# Patient Record
Sex: FEMALE | Race: WHITE | URBAN - METROPOLITAN AREA
[De-identification: names, ages, dates, MRNs, and addresses within clinical notes are randomized per-mention and may not be internally consistent; named-entity substitution may affect disease eponyms.]

---

## 2017-01-12 ENCOUNTER — OFFICE VISIT (OUTPATIENT)
Dept: NEUROLOGY | Facility: CLINIC | Age: 57
End: 2017-01-12

## 2017-01-12 VITALS — HEART RATE: 72 BPM | SYSTOLIC BLOOD PRESSURE: 185 MMHG | DIASTOLIC BLOOD PRESSURE: 115 MMHG | RESPIRATION RATE: 16 BRPM

## 2017-01-12 DIAGNOSIS — G43.009 MIGRAINE WITHOUT AURA AND WITHOUT STATUS MIGRAINOSUS, NOT INTRACTABLE: Primary | ICD-10-CM

## 2017-01-12 RX ORDER — NORTRIPTYLINE HCL 10 MG
CAPSULE ORAL
Qty: 90 CAPSULE | Refills: 1 | Status: SHIPPED | OUTPATIENT
Start: 2017-01-12

## 2017-01-12 RX ORDER — LISINOPRIL 20 MG/1
20 TABLET ORAL DAILY
COMMUNITY

## 2017-01-12 ASSESSMENT — PAIN SCALES - GENERAL: PAINLEVEL: MILD PAIN (3)

## 2017-01-12 NOTE — MR AVS SNAPSHOT
After Visit Summary   2017    Kamla Francisco    MRN: [id number]           Patient Information     Date Of Birth          1960        Visit Information        Provider Department      2017 2:30 PM Guilherme Garrison MD [facility]        Today's Diagnoses     Migraine without aura and without status migrainosus, not intractable    -  1        Follow-ups after your visit        Follow-up notes from your care team     Return in about 4 weeks (around 2017).      Your next 10 appointments already scheduled     2017 10:30 AM   Return Visit with Guilherme Garrison MD   [facility] ([facility])    [address], [address]  [facility] [address] [phone]              Who to contact     Please call your clinic at [phone] to:    Ask questions about your health    Make or cancel appointments    Discuss your medicines    Learn about your test results    Speak to your doctor   If you have compliments or concerns about an experience at your clinic, or if you wish to file a complaint, please contact [facility] Patient Relations at [phone] or email us at [email or web address]         Additional Information About Your Visit        MyChart Information     [employer] is an electronic gateway that provides easy, online access to your medical records. With [employer] [place], you can request a clinic appointment, read your test results, renew a prescription or communicate with your care team.     To sign up for [employer] visit the website at [email or web address]   You will be asked to enter the access code listed below, as well as some personal information. Please follow the directions to create your username and password.     Your access code is: [email or web address]  Expires: 2017  3:03 PM     Your access code will  in 90 days. If  you need help or a new code, please contact your [facility] or call [phone] for assistance.        Care EveryWhere ID     This is your Care EveryWhere ID. This could be used by other organizations to access your [place] medical records  [id number]        Your Vitals Were     Pulse Respirations                72 16           Blood Pressure from Last 3 Encounters:   01/12/17 185/115    Weight from Last 3 Encounters:   No data found for Wt              Today, you had the following     No orders found for display         Today's Medication Changes          These changes are accurate as of: 1/12/17  3:03 PM.  If you have any questions, ask your nurse or doctor.               Start taking these medicines.        Dose/Directions    nortriptyline 10 MG capsule   Commonly known as:  PAMELOR   Used for:  Migraine without aura and without status migrainosus, not intractable        Start at 1 tab at bedtime for 5 days, then 2 tabs at bedtime for 5 days, then 3 tabs at bedtime.   Quantity:  90 capsule   Refills:  1            Where to get your medicines      These medications were sent to [facility] [address] [place] [address]    Hours:  24-hours Phone:  [phone]    - nortriptyline 10 MG capsule             Primary Care Provider Office Phone # Fax #    Janis Berman [phone]       [facility] [address] [address]        Thank you!     Thank you for choosing [facility]  for your care. Our goal is always to provide you with excellent care. Hearing back from our patients is one way we can continue to improve our services. Please take a few minutes to complete the written survey that you may receive in the mail after your visit with us. Thank you!             Your Updated Medication List -  Protect others around you: Learn how to safely use, store and throw away your medicines at www.disposemymeds.org.          This list is accurate as of: 1/12/17  3:03 PM.  Always use your most recent med list.                   Brand Name Dispense Instructions for use    lisinopril 20 MG tablet    PRINIVIL/ZESTRIL     Take 20 mg by mouth daily       nortriptyline 10 MG capsule    PAMELOR    90 capsule    Start at 1 tab at bedtime for 5 days, then 2 tabs at bedtime for 5 days, then 3 tabs at bedtime.

## 2017-01-12 NOTE — Clinical Note
"2017       RE: Kamla Francisco  [address] [place] [address]     Dear Colleague,    Thank you for referring your patient, Kamla Francisco, to the [facility] at [facility]. Please see a copy of my visit note below.    2017      WILBUR Ingram   [facility]    [address] [place] [address]      RE: Kamla Francisco   MRN: [id number]   : 1960      Dear Janis:      Thank you for the referral of Kamla Francisco for neurologic consultation with chief complaint of headache.  As you know, she is a 56-year-old right-handed woman.  She says she has had migraine since the 2nd grade.  Her headaches have gotten worse in the last 8 months or so.  She used to get a headache every month or 2 and now she is getting the bad headaches weekly in addition to a chronic daily headache.  The daily headache is more in the occipital and posterior cervical region.  It is a dull pain and radiates into the left side of the head more than the right side of the head.  It is annoying.  She has one when she is talking to me today.  She will take ibuprofen and drink water.  She is using ibuprofen 3-4 times a day for this headache.  In addition, she gets migraine headaches.  These are associated with nausea and vomiting.  They typically occur during sleep and awaken her.  It is present in the back of the head.  She feels like she has \"been hit by a bat.\"  It is a pounding and a pressure.  She will take Excedrin and drink a Coca Cola.  She will have photophobia, phonophobia and sensitivity to smells.  She will have to try and get to sleep with the headache.  It will wipe her out all day.  She works at a [employer] center and has not been working the last 3 months for various reasons.  She has been seeing a therapist for some stress issues and depression.  She does not notice anything new or a " different quality to the migraine type headache, only that it is occurring more frequently.  She did have an MRI scan of the brain performed in 2014 and that was normal.  A CT scan of the brain was performed in 2016 and that too was normal.  She is using Excedrin and ibuprofen pretty much on a daily basis.  She has been treated with propranolol in the past.  She has never been on nortriptyline, gabapentin or topiramate.  She has been on Imitrex in the past as well without benefit.  She was given a prescription for trazodone today by her therapist but has not yet filled it.  There was also a question of whether or not to start Lyrica today.  She also reports symptoms of intermittent vertigo when she lies down and turns her head.  She has no problem otherwise with vision, hearing, speech or swallowing.  She has no problem with bowel or bladder control.  She has no symptoms with regard to smell or taste.  She does get occasional numbness and tingling in her arms or legs.  She does comment that the daily over-the-counter remedies are upsetting her stomach.      Her past medical history is largely noncontributory.  She does have high blood pressure.  She does not have diabetes, thyroid or asthma.  She has not had any pertinent surgery.  She does not drink or smoke.  She has no history of head injury.  Social history is that she is a , has 4 children.  She works in a [employer] center.  Family history is noncontributory and negative for headache.      On exam, the patient is cooperative and in no distress.  Her blood pressure is 185/115.  There are no carotid bruits.  Auscultation of the heart shows S1, S2, without murmur, rub or gallop.  Chest is clear to auscultation.  On neurologic exam, the patient is alert, oriented and lucid.  Cranial nerve testing shows full visual fields to confrontation.  Funduscopic exam shows sharp discs bilaterally.  Eye movements are complete and conjugate without nystagmus.  Visual acuity is  20/20 bilaterally.  Funduscopic exam does show venous pulsations.  Pupils react to light.  Facies are symmetric.  Tongue protrudes in the midline.  Motor evaluation shows no pronator drift, normal finger tapping, finger-nose-finger and heel-knee-shin.  She has good strength in the arms and legs.  Muscle stretch reflexes are trace in the arms and reduced at the knees and ankles.  Toes are downgoing.  Sensory exam shows preserved vibration and temperature.  Romberg sign is absent.  The patient can walk on her heels and toes.  Her tandem gait backwards is unsteady.      ASSESSMENT:   1.  Mixed headache disorder, including common migraine and tension headache.      DISCUSSION:  The patient is seen for evaluation of headache.  Her exam on these points is normal.  Presentation suggests tension headache in addition to an increased number of migraine type headaches.  She is using anti-inflammatories and Excedrin on a daily basis.  This is not a  way to move forward successfully, and she could get into problems with medication overuse.  I am going to try her on nortriptyline 10 mg at night, building up to 30 mg at night.  Side effects were reviewed including heart palpitations.  If she has problems with the medicine, she knows to stop it and call me.  Hopefully, she will be able to cut back on her use of over-the-counter remedies with the nortriptyline and hopefully it will help with her sleep as well.  I will see her in followup in a month, but encouraged her to call if there were questions or problems before then.      Sincerely,      Guilherme Garrison MD           D: 2017 15:11   T: 2017 07:31   MT: TAMI      Name:     LUDMILA REN   MRN:      [id number]        Account:      [id number]   :      1960           Service Date: 2017      Document: [id number]

## 2017-01-13 NOTE — PROGRESS NOTES
"2017      WILBUR Ingram   [facility]    [address] [place] [address]      RE: Kamla Francisco   MRN: [id number]   : 1960      Dear Janis:      Thank you for the referral of Kamla Fracnisco for neurologic consultation with chief complaint of headache.  As you know, she is a 56-year-old right-handed woman.  She says she has had migraine since the 2nd grade.  Her headaches have gotten worse in the last 8 months or so.  She used to get a headache every month or 2 and now she is getting the bad headaches weekly in addition to a chronic daily headache.  The daily headache is more in the occipital and posterior cervical region.  It is a dull pain and radiates into the left side of the head more than the right side of the head.  It is annoying.  She has one when she is talking to me today.  She will take ibuprofen and drink water.  She is using ibuprofen 3-4 times a day for this headache.  In addition, she gets migraine headaches.  These are associated with nausea and vomiting.  They typically occur during sleep and awaken her.  It is present in the back of the head.  She feels like she has \"been hit by a bat.\"  It is a pounding and a pressure.  She will take Excedrin and drink a Coca Cola.  She will have photophobia, phonophobia and sensitivity to smells.  She will have to try and get to sleep with the headache.  It will wipe her out all day.  She works at a [employer] center and has not been working the last 3 months for various reasons.  She has been seeing a therapist for some stress issues and depression.  She does not notice anything new or a different quality to the migraine type headache, only that it is occurring more frequently.  She did have an MRI scan of the brain performed in  and that was normal.  A CT scan of the brain was performed in  and that too was normal.  She is using Excedrin and ibuprofen pretty much on a daily basis.  She has been " treated with propranolol in the past.  She has never been on nortriptyline, gabapentin or topiramate.  She has been on Imitrex in the past as well without benefit.  She was given a prescription for trazodone today by her therapist but has not yet filled it.  There was also a question of whether or not to start Lyrica today.  She also reports symptoms of intermittent vertigo when she lies down and turns her head.  She has no problem otherwise with vision, hearing, speech or swallowing.  She has no problem with bowel or bladder control.  She has no symptoms with regard to smell or taste.  She does get occasional numbness and tingling in her arms or legs.  She does comment that the daily over-the-counter remedies are upsetting her stomach.      Her past medical history is largely noncontributory.  She does have high blood pressure.  She does not have diabetes, thyroid or asthma.  She has not had any pertinent surgery.  She does not drink or smoke.  She has no history of head injury.  Social history is that she is a , has 4 children.  She works in a [employer] center.  Family history is noncontributory and negative for headache.      On exam, the patient is cooperative and in no distress.  Her blood pressure is 185/115.  There are no carotid bruits.  Auscultation of the heart shows S1, S2, without murmur, rub or gallop.  Chest is clear to auscultation.  On neurologic exam, the patient is alert, oriented and lucid.  Cranial nerve testing shows full visual fields to confrontation.  Funduscopic exam shows sharp discs bilaterally.  Eye movements are complete and conjugate without nystagmus.  Visual acuity is 20/20 bilaterally.  Funduscopic exam does show venous pulsations.  Pupils react to light.  Facies are symmetric.  Tongue protrudes in the midline.  Motor evaluation shows no pronator drift, normal finger tapping, finger-nose-finger and heel-knee-shin.  She has good strength in the arms and legs.  Muscle stretch reflexes  are trace in the arms and reduced at the knees and ankles.  Toes are downgoing.  Sensory exam shows preserved vibration and temperature.  Romberg sign is absent.  The patient can walk on her heels and toes.  Her tandem gait backwards is unsteady.      ASSESSMENT:   1.  Mixed headache disorder, including common migraine and tension headache.      DISCUSSION:  The patient is seen for evaluation of headache.  Her exam on these points is normal.  Presentation suggests tension headache in addition to an increased number of migraine type headaches.  She is using anti-inflammatories and Excedrin on a daily basis.  This is not a  way to move forward successfully, and she could get into problems with medication overuse.  I am going to try her on nortriptyline 10 mg at night, building up to 30 mg at night.  Side effects were reviewed including heart palpitations.  If she has problems with the medicine, she knows to stop it and call me.  Hopefully, she will be able to cut back on her use of over-the-counter remedies with the nortriptyline and hopefully it will help with her sleep as well.  I will see her in followup in a month, but encouraged her to call if there were questions or problems before then.      Sincerely,      MD OCHOA Lunfsord MD             D: 2017 15:11   T: 2017 07:31   MT: TAMI      Name:     LUDMILA REN   MRN:      [id number]        Account:      [id number]   :      1960           Service Date: 2017      Document: [id number]         Addendum: I called pt to check in on her b/p and response to nortriptyline.  She does monitor b/p and today it was 140's/85.  No change in headache yet.  F/u as sched.

## 2017-02-09 ENCOUNTER — OFFICE VISIT (OUTPATIENT)
Dept: NEUROLOGY | Facility: CLINIC | Age: 57
End: 2017-02-09

## 2017-02-09 VITALS — SYSTOLIC BLOOD PRESSURE: 136 MMHG | HEART RATE: 76 BPM | RESPIRATION RATE: 18 BRPM | DIASTOLIC BLOOD PRESSURE: 88 MMHG

## 2017-02-09 DIAGNOSIS — G43.009 MIGRAINE WITHOUT AURA AND WITHOUT STATUS MIGRAINOSUS, NOT INTRACTABLE: Primary | ICD-10-CM

## 2017-02-09 RX ORDER — GABAPENTIN 300 MG/1
300 CAPSULE ORAL SEE ADMIN INSTRUCTIONS
Qty: 90 CAPSULE | Refills: 1 | Status: SHIPPED | OUTPATIENT
Start: 2017-02-09 | End: 2017-04-25

## 2017-02-09 ASSESSMENT — PAIN SCALES - GENERAL: PAINLEVEL: MODERATE PAIN (4)

## 2017-02-09 NOTE — PROGRESS NOTES
2017      WILBUR Ingram   [facility]    [address] [place] [address]      RE: Kamla Francisco   MRN: [id number]   : 1960      Dear Janis:      I am writing to you in followup on Kamla Francisco with chief complaint of headache.  She has mixed headache disorder, including common migraine and tension headache.  I saw her about a month ago.  I put her on nortriptyline.  Things are not going well.  Nortriptyline is not helping at all.  In addition, she had tried a dose of trazodone on one occasion to see if it would help her sleep and it did not.  She had a prescription for Lyrica but never filled it.  Her headaches tend to begin in the posterior cervical region.  If it is a migraine, she notices that her eyes will get blurry shortly after the headache begins in the back of the neck.  She will have dark spots in her vision.  Within 10 minutes, her headache will rapidly escalate to a migraine level.  She will take Excedrin and drink half of a Coca Cola.  She will have nausea and emesis.  She will then go rest in a dark quiet room.  She has not found Imitrex beneficial.  She is on disability through work.  She also has a 's pension through the [employer].  Her diet is good.      On exam, the patient is cooperative and in no distress.  Her blood pressure is 136/88.  Visual acuity is 20/20 bilaterally.  Funduscopic exam shows sharp discs with venous pulsations.      ASSESSMENT:   1.  Mixed headache disorder including common migraine and tension headache.      DISCUSSION:  The patient is seen with the above problem list.  At this point, I am going to have her taper off nortriptyline.  I am going to start her on gabapentin, building up to 300 mg 3 times a day.  Hopefully, this will be more useful than the nortriptyline.  I will see her in followup in about 6 weeks or sooner if there are problems.      Sincerely,       MD OCHOA Lunsford MD              D: 2017 10:52   T: 2017 11:52   MT: TAMI      Name:     LUDMILA REN   MRN:      [id number]        Account:      [id number]   :      1960           Service Date: 2017      Document: [id number]

## 2017-02-09 NOTE — MR AVS SNAPSHOT
After Visit Summary   2017    Kamla Francisco    MRN: [id number]           Patient Information     Date Of Birth          1960        Visit Information        Provider Department      2017 10:30 AM Guilherme Garrison MD [facility]        Today's Diagnoses     Migraine without aura and without status migrainosus, not intractable    -  1        Follow-ups after your visit        Follow-up notes from your care team     Return in about 6 weeks (around 3/23/2017).      Your next 10 appointments already scheduled     Mar 23, 2017 10:30 AM   Return Visit with Guilherme Garrison MD   [facility] ([facility])    [address], [address]  [facility] [address] [phone]              Who to contact     Please call your clinic at [phone] to:    Ask questions about your health    Make or cancel appointments    Discuss your medicines    Learn about your test results    Speak to your doctor   If you have compliments or concerns about an experience at your clinic, or if you wish to file a complaint, please contact [facility] Patient Relations at [phone] or email us at [email or web address]         Additional Information About Your Visit        MyChart Information     [employer] is an electronic gateway that provides easy, online access to your medical records. With [employer], you can request a clinic appointment, read your test results, renew a prescription or communicate with your care team.     To sign up for [employer] visit the website at [email or web address]   You will be asked to enter the access code listed below, as well as some personal information. Please follow the directions to create your username and password.     Your access code is: [email or web address]  Expires: 2017  3:03 PM     Your access code will  in 90 days. If  you need help or a new code, please contact your [facility] or call [phone] for assistance.        Care EveryWhere ID     This is your Care EveryWhere ID. This could be used by other organizations to access your [place] medical records  [id number]        Your Vitals Were     Pulse Respirations                76 18           Blood Pressure from Last 3 Encounters:   02/09/17 136/88   01/12/17 185/115    Weight from Last 3 Encounters:   No data found for Wt              Today, you had the following     No orders found for display         Today's Medication Changes          These changes are accurate as of: 2/9/17 10:42 AM.  If you have any questions, ask your nurse or doctor.               Start taking these medicines.        Dose/Directions    gabapentin 300 MG capsule   Commonly known as:  NEURONTIN   Used for:  Migraine without aura and without status migrainosus, not intractable   Started by:  Guilherme Garrison MD        Dose:  300 mg   Take 1 capsule (300 mg) by mouth See Admin Instructions One po qday for 3 days, then one po bid for 3 days, then one po tid.   Quantity:  90 capsule   Refills:  1            Where to get your medicines      These medications were sent to [facility] [address] [place] [address] & [address] [address]    Hours:  24-hours Phone:  [phone]    - gabapentin 300 MG capsule             Primary Care Provider Office Phone # Fax Sneha Berman [phone]       [facility] [address]  [facility] [address]        Thank you!     Thank you for choosing [facility]  for your care. Our goal is always to provide you with excellent care. Hearing back from our patients is one way we can continue to improve our services. Please take a few minutes to complete the written survey that you may  receive in the mail after your visit with us. Thank you!             Your Updated Medication List - Protect others around you: Learn how to safely use, store and throw away your medicines at www.disposemymeds.org.          This list is accurate as of: 2/9/17 10:42 AM.  Always use your most recent med list.                   Brand Name Dispense Instructions for use    gabapentin 300 MG capsule    NEURONTIN    90 capsule    Take 1 capsule (300 mg) by mouth See Admin Instructions One po qday for 3 days, then one po bid for 3 days, then one po tid.       lisinopril 20 MG tablet    PRINIVIL/ZESTRIL     Take 20 mg by mouth daily       nortriptyline 10 MG capsule    PAMELOR    90 capsule    Start at 1 tab at bedtime for 5 days, then 2 tabs at bedtime for 5 days, then 3 tabs at bedtime.

## 2017-02-09 NOTE — LETTER
2017       RE: Kamla Francisco  [address] [place] [address]     Dear Colleague,    Thank you for referring your patient, Kamla Francisco, to the [facility] at [facility]. Please see a copy of my visit note below.    2017      WILBUR Ingram   [facility]    [address] [place] [address]      RE: Kamla Francisco   MRN: [id number]   : 1960      Dear Janis:      I am writing to you in followup on Kamla Francisco with chief complaint of headache.  She has mixed headache disorder, including common migraine and tension headache.  I saw her about a month ago.  I put her on nortriptyline.  Things are not going well.  Nortriptyline is not helping at all.  In addition, she had tried a dose of trazodone on one occasion to see if it would help her sleep and it did not.  She had a prescription for Lyrica but never filled it.  Her headaches tend to begin in the posterior cervical region.  If it is a migraine, she notices that her eyes will get blurry shortly after the headache begins in the back of the neck.  She will have dark spots in her vision.  Within 10 minutes, her headache will rapidly escalate to a migraine level.  She will take Excedrin and drink half of a Coca Cola.  She will have nausea and emesis.  She will then go rest in a dark quiet room.  She has not found Imitrex beneficial.  She is on disability through work.  She also has a 's pension through the [employer].  Her diet is good.      On exam, the patient is cooperative and in no distress.  Her blood pressure is 136/88.  Visual acuity is 20/20 bilaterally.  Funduscopic exam shows sharp discs with venous pulsations.      ASSESSMENT:   1.  Mixed headache disorder including common migraine and tension headache.      DISCUSSION:  The patient is seen with the above problem list.  At this point, I am going to have  her taper off nortriptyline.  I am going to start her on gabapentin, building up to 300 mg 3 times a day.  Hopefully, this will be more useful than the nortriptyline.  I will see her in followup in about 6 weeks or sooner if there are problems.      Sincerely,       Guilherme Garrison MD          D: 2017 10:52   T: 2017 11:52   MT: TAMI      Name:     LUDMILA REN   MRN:      [id number]        Account:      [id number]   :      1960           Service Date: 2017      Document: [id number]

## 2017-04-25 DIAGNOSIS — G43.009 MIGRAINE WITHOUT AURA AND WITHOUT STATUS MIGRAINOSUS, NOT INTRACTABLE: ICD-10-CM

## 2017-04-25 RX ORDER — GABAPENTIN 300 MG/1
300 CAPSULE ORAL 3 TIMES DAILY
Qty: 270 CAPSULE | Refills: 3 | Status: SHIPPED | OUTPATIENT
Start: 2017-04-25